# Patient Record
Sex: FEMALE | Race: WHITE | NOT HISPANIC OR LATINO | URBAN - METROPOLITAN AREA
[De-identification: names, ages, dates, MRNs, and addresses within clinical notes are randomized per-mention and may not be internally consistent; named-entity substitution may affect disease eponyms.]

---

## 2019-12-18 PROBLEM — Z00.00 ENCOUNTER FOR PREVENTIVE HEALTH EXAMINATION: Status: ACTIVE | Noted: 2019-12-18

## 2020-04-07 ENCOUNTER — EMERGENCY (EMERGENCY)
Facility: HOSPITAL | Age: 65
LOS: 1 days | Discharge: HOME | End: 2020-04-07
Attending: EMERGENCY MEDICINE
Payer: MEDICARE

## 2020-04-07 ENCOUNTER — TRANSCRIPTION ENCOUNTER (OUTPATIENT)
Age: 65
End: 2020-04-07

## 2020-04-07 VITALS
HEIGHT: 69 IN | RESPIRATION RATE: 18 BRPM | SYSTOLIC BLOOD PRESSURE: 162 MMHG | TEMPERATURE: 98 F | WEIGHT: 167.99 LBS | HEART RATE: 76 BPM | OXYGEN SATURATION: 96 % | DIASTOLIC BLOOD PRESSURE: 92 MMHG

## 2020-04-07 PROCEDURE — 25605 CLTX DST RDL FX/EPHYS SEP W/: CPT | Mod: LT

## 2020-04-07 PROCEDURE — 96374 THER/PROPH/DIAG INJ IV PUSH: CPT | Mod: XU

## 2020-04-07 PROCEDURE — 73110 X-RAY EXAM OF WRIST: CPT

## 2020-04-07 PROCEDURE — 99284 EMERGENCY DEPT VISIT MOD MDM: CPT

## 2020-04-07 PROCEDURE — 99285 EMERGENCY DEPT VISIT HI MDM: CPT | Mod: 25

## 2020-04-07 PROCEDURE — 73110 X-RAY EXAM OF WRIST: CPT | Mod: 26,LT

## 2020-04-07 RX ORDER — MORPHINE SULFATE 50 MG/1
4 CAPSULE, EXTENDED RELEASE ORAL ONCE
Refills: 0 | Status: DISCONTINUED | OUTPATIENT
Start: 2020-04-07 | End: 2020-04-07

## 2020-04-07 RX ORDER — IBUPROFEN 200 MG
1 TABLET ORAL
Qty: 20 | Refills: 0
Start: 2020-04-07 | End: 2020-04-11

## 2020-04-07 RX ADMIN — MORPHINE SULFATE 4 MILLIGRAM(S): 50 CAPSULE, EXTENDED RELEASE ORAL at 16:45

## 2020-04-07 NOTE — ED PROVIDER NOTE - ATTENDING CONTRIBUTION TO CARE
Dennis: I performed a face to face bedside interview with patient regarding history of present illness, review of symptoms and past medical history. I completed an independent physical exam.  I have discussed patient's plan of care with advanced care provider.   I agree with note as stated above including HISTORY OF PRESENT ILLNESS, HIV, PAST MEDICAL/SURGICAL/FAMILY/SOCIAL HISTORY, ALLERGIES AND HOME MEDICATIONS, REVIEW OF SYSTEMS, PHYSICAL EXAM, MEDICAL DECISION MAKING and any PROGRESS NOTES during the time I functioned as the attending physician for this patient  unless otherwise noted. My brief assessment is as follows: pt with mechanical fall while walking dog, with injury to left wrist. had xray at urgent care with colles fracture. neurovascularly intact. no other injury. ortho consulted for reduction, splint. to f/u ortho.

## 2020-04-07 NOTE — ED PROVIDER NOTE - PHYSICAL EXAMINATION
Constitutional - well-developed; well nourished. Head - NCAT. Airway patent. Eyes - PERRL. CV - RRR. no murmur. no edema. Pulm - CTAB. Abd - soft, nt. no rebound. no guarding. Neuro - A&Ox3. strength 5/5 x4. sensation intact x4. normal gait. Skin - abrasions to face MSK - LROM to L wrist, immobilized in sling and ACE.

## 2020-04-07 NOTE — ED PROVIDER NOTE - PATIENT PORTAL LINK FT
You can access the FollowMyHealth Patient Portal offered by [facility] by registering at the following website: [email or web address]. By joining [facility]’s FollowMyHealth portal, you will also be able to view your health information using other applications (apps) compatible with our system.

## 2020-04-07 NOTE — ED ADULT TRIAGE NOTE - CHIEF COMPLAINT QUOTE
patient walking dog this morning and tripped and fell landed on face and left arm, patient went to Urgent Care and was told that she has a broken left hand.

## 2020-04-07 NOTE — ED PROVIDER NOTE - NS ED ROS FT
No fever/chills, No photophobia/eye pain/changes in vision, No ear pain/sore throat/dysphagia, No chest pain/palpitations, no SOB/cough/wheeze/stridor, No abdominal pain, No N/V/D, no dysuria/frequency/discharge, L wrist pain, No neck/back pain, no rash, no changes in neurological status/function.

## 2020-04-07 NOTE — ED PROVIDER NOTE - CCCP TRG CHIEF CMPLNT
Spoke with patient will have c3/4 and c4/4 and c4/5 Laminectomy and c3 to c5 lateral mass fusion on 7/23/18 at [facility]. The Pre op lab orders were placed EKG at time of Pre-Op visit with Dr. Cb Álvarez. arm pain/injury

## 2020-04-07 NOTE — ED PROVIDER NOTE - OBJECTIVE STATEMENT
This is a 64 year old female with c/o L wrist pain s/p while walking her dog.  She notes tripped over a root in the park and braced fall with her chest.  She notes hit face on gravel.  She did not lose consciousness.  She denies any neck pain or any n/v/d or any sick contacts, recent travel or rashes.

## 2020-04-07 NOTE — CONSULT NOTE ADULT - SUBJECTIVE AND OBJECTIVE BOX
Pt Name: MOLLY MCCRACKEN    MRN: [id number]      Patient is a 64y Female presenting to the emergency department with a chief complaint of left wrist pain and deformity s/p fall today. Pt says she was walking her dog in a wooded area and tripped over a root and fell onto her left hand. LHD. No elbow or shoulder pain. No numbness or tingling in extremity. No other complaints.    HEALTH ISSUES - PROBLEM Dx:  Left distal radius fx  .  REVIEW OF SYSTEMS    General:	No fevers/chills    Skin/Breast: No rash	    Respiratory and Thorax: No cough or SOB  	  Cardiovascular:	No CP    Gastrointestinal:	 No nausea    Musculoskeletal:	 See HPI    Neurological:	See HPI    ROS is otherwise negative.    PAST MEDICAL & SURGICAL HISTORY:  PAST MEDICAL & SURGICAL HISTORY:      Allergies: No Known Allergies      Medications: Lexapro    FAMILY HISTORY:  : non-contributory    Social History:     Ambulation: Walking independently [X ] With Cane [ ] With Walker [ ]  Bedbound [ ]               PHYSICAL EXAM:    Vital Signs Last 24 Hrs  T(C): 36.7 (07 Apr 2020 15:51), Max: 36.7 (07 Apr 2020 15:51)  T(F): 98 (07 Apr 2020 15:51), Max: 98 (07 Apr 2020 15:51)  HR: 76 (07 Apr 2020 15:51) (76 - 76)  BP: 162/92 (07 Apr 2020 15:51) (162/92 - 162/92)  BP(mean): --  RR: 18 (07 Apr 2020 15:51) (18 - 18)  SpO2: 96% (07 Apr 2020 15:51) (96% - 96%)  Daily Height in cm: 175.26 (07 Apr 2020 15:51)    Daily     Appearance: Alert, responsive, in no acute distress.    Neurological: Sensation is grossly intact to light touch. 5/5 motor function of all extremities. No focal deficits or weaknesses found.    Skin: no rash on visible skin. Skin is clean, dry and intact. No bleeding. No abrasions. No ulcerations.    Vascular: 2+ distal pulses. Cap refill < 2 sec. No extremity ulcerations. No cyanosis.    Musculoskeletal:       Left Upper Extremity: +deformity wrist. Skin intact. +swelling wrist. +TTP wrist. Elbow and shoulder NTTP. +ROM elbow/shoulder/fingers. Radial pulse 2+. Sensation intact distally. Brisk cap refill.      Imaging Studies:  Xray left wrist- +dorsally displaced distal radius fx    A/P:  Pt is a  64y Female with left distal radius fx    PLAN:   -Pain control  -See procedure note  -Skin prepped with chloroprep, hematoma block given into left wrist with 10cc 1% lidocaine  -Closed reduction left distal radius and application of sugar tong splint  -Post reduction xrays- good alignment  -Splint care, NWB LUE, Elevate LUE  -F/U with Dr Mcgowan this week. Call for appointment  D/W Dr Mcgowan